# Patient Record
Sex: FEMALE | URBAN - METROPOLITAN AREA
[De-identification: names, ages, dates, MRNs, and addresses within clinical notes are randomized per-mention and may not be internally consistent; named-entity substitution may affect disease eponyms.]

---

## 2022-07-08 ENCOUNTER — APPOINTMENT (RX ONLY)
Dept: URBAN - METROPOLITAN AREA CLINIC 45 | Facility: CLINIC | Age: 72
Setting detail: DERMATOLOGY
End: 2022-07-08

## 2022-07-08 DIAGNOSIS — R21 RASH AND OTHER NONSPECIFIC SKIN ERUPTION: ICD-10-CM | Status: INADEQUATELY CONTROLLED

## 2022-07-08 PROCEDURE — 99204 OFFICE O/P NEW MOD 45 MIN: CPT

## 2022-07-08 PROCEDURE — ? ADDITIONAL NOTES

## 2022-07-08 PROCEDURE — ? COUNSELING

## 2022-07-08 PROCEDURE — ? MEDICATION COUNSELING

## 2022-07-08 PROCEDURE — ? PRESCRIPTION

## 2022-07-08 PROCEDURE — ? PRESCRIPTION MEDICATION MANAGEMENT

## 2022-07-08 RX ORDER — DOXYCYCLINE HYCLATE 100 MG/1
CAPSULE, GELATIN COATED ORAL BID
Qty: 28 | Refills: 0 | Status: ERX | COMMUNITY
Start: 2022-07-08

## 2022-07-08 RX ADMIN — DOXYCYCLINE HYCLATE: 100 CAPSULE, GELATIN COATED ORAL at 00:00

## 2022-07-08 ASSESSMENT — LOCATION DETAILED DESCRIPTION DERM: LOCATION DETAILED: RIGHT SUPERIOR PARIETAL SCALP

## 2022-07-08 ASSESSMENT — LOCATION ZONE DERM: LOCATION ZONE: SCALP

## 2022-07-08 ASSESSMENT — LOCATION SIMPLE DESCRIPTION DERM: LOCATION SIMPLE: SCALP

## 2022-07-08 NOTE — PROCEDURE: ADDITIONAL NOTES
Additional Notes: Pt describes pimples that pop up.  Had a bx at [facility] and said they couldn’t find anything.  Did patch testing and it was negative.  Has been treated with clobetasol and flucon Additional Notes: Pt describes pimples that pop up.  Had a bx at [facility] and said they couldn’t find anything.  Did patch testing and it was negative.  Has been treated with clobetasol and flucon

## 2022-07-08 NOTE — PROCEDURE: PRESCRIPTION MEDICATION MANAGEMENT
Detail Level: Zone
Render In Strict Bullet Format?: No
Initiate Treatment: Doxycycline 1 by mouth bid x 2 weeks
Plan: Patient used to see Dr. Newsome and has had biopsy. She doesn’t recall exactly what pathology said \\nDeclines history of culture being done \\nPatient has had allergy testing done at previous derm and no results came back positive \\nShe has also tried fluconazole with little to no improvement

## 2022-07-08 NOTE — PROCEDURE: MEDICATION COUNSELING
29-Mar-2017 Elidel Counseling: Patient may experience a mild burning sensation during topical application. Elidel is not approved in children less than 2 years of age. There have been case reports of hematologic and skin malignancies in patients using topical calcineurin inhibitors although causality is questionable.

## 2022-07-08 NOTE — HPI: OTHER
Condition:: Rash
Please Describe Your Condition:: Pt is in the office today for a rash on her scalp, has been treated in the past for it with another derm office. \\nPt mentioned it’s coming and going. And has gotten prescribed clobetasol foam As well as fluconazole. Also got a biopsy done , didn’t show anything and also got allergy testing. Wants to know what she can do to further evaluate this. \\nNew Pt to [facility]

## 2025-04-29 NOTE — PROCEDURE: MEDICATION COUNSELING
Outreach attempt was made to schedule a Medicare Wellness Visit. This was the first attempt. Contact was made, [facility] appointment scheduled.   Griseofulvin Counseling:  I discussed with the patient the risks of griseofulvin including but not limited to photosensitivity, cytopenia, liver damage, nausea/vomiting and severe allergy.  The patient understands that this medication is best absorbed when taken with a fatty meal (e.g., ice cream or french fries).